# Patient Record
(demographics unavailable — no encounter records)

---

## 2025-02-15 NOTE — SOCIAL HISTORY
[FreeTextEntry6] : Patient lives with sisters (Balta, 9, Tamie and Kash 5, Carlos 1 1/2).   Mother's Occupation: Homemaker   Father's Occupation:     Parents live on top floor of maternal grandmother and step-father's home.  8/17/2021: Tamie was diagnosed with ASD at 18 months, though she is making good progress. She has a lot of sensory issues. She is scheduled for an eval with me in a few months. Her sister Kash is receiving ST and SI but she does not have ASD symptoms. Balta receives ST and OT, will go into regular  class. 8/17/22: Both sisters will be going to [employer] - Tamie (10:1:2) Kash (12:1:2). Balta will be going into ICT class for 1st. 4/10/23: Mom is pregnant and due in September so they are working with him at school about being gentle with a baby. Tamie will be staying at [facility] though they want her in general ed for , Kash will be going to [employer] ed [employer]. Balta is in 1st grade. 10/11/23: Mom just had a baby girl 3 weeks ago, doing well. Dad got a promotion so his schedule is changing. He will be a K9 officer so he will be getting a puppy.  12/22/23: New dog, no other interval changes 6/26/24: No interval changes 2/10/25: No interval changes

## 2025-02-15 NOTE — HISTORY OF PRESENT ILLNESS
[de-identified] : Stopped soccer, wasn't interested  Says sarwat Monique when prompted   [Parents] : parents [Home] : at home, [unfilled] , at the time of the visit. [Medical Office: ([facility])___] : at the medical office located in  [Telehealth (audio & video)] : This visit was provided via telehealth using real-time 2-way audio visual technology. [FreeTextEntry5] : Placement: 2nd grade Type of Class: self-contained 8:1:2, KALYAN class, K-2 bridge [employer], [place]     Special Education: IEP   Classification: Autism   Therapy: OT 2x30 (ind), PT 2x30 (group), ST 3x30 (ind) Other Accommodations & Services:  - Paraprofessional 1:1 - Approved for BIS, parent training and home ST , so far only ST started (2x30, different therapist who has been great). Waiting on behavioral therapist  Private: - [facility]WDD: approved for front door, uses stipend for music therapy, told they cannot get medicaid waiver until 8 - S/P [facility] therapy at the [facility] ([place]) - Working with advocate - S/P LI Speech Tx for feeding therapy work on AAC device but it was not very helpful  - S/P KALYAN in home - [facility] [place]   Prior Hx: Christy was evaluated by EI at 1 1/2 due to speech delay, poor eye contact/social engagement and repetitive behaviors, diagnosed with ASD. Started receiving KALYAN in June 2019.   [FreeTextEntry1] : Medication; - Increased risperidone to 2 ml at some point this year due to increase in hitting head, throwing self to floor - Currently headbanging is pretty rare - Still may throw self to floor at school but they set a timer and then he is able to recover - Usual triggers for this behavior are when he is asked to do non-preferred task   - Appetite: has a big appetite, no recent labwork - Sleep: Takes melatonin at 6:30 PM (2 mg), usually asleep by 7:45 PM. Falls asleep on floor as he is stimming on floor and then dad puts him in bed (zipped into tent). Around 3 AM he wakes up, restless, kicking walls, thrusting  - parents do not go in, usually falls back asleep in an hour and then wakes at 6 AM. Not sleepy during day. - No obvious headaches, stomachaches - No atypical movements noted, does have a lot of stimming behaviors - Occasionally see staring spells, may roll eyes back (seems to do it intentionally, will try touch eyeballs)  - May pull eyelashes at times - BMs 3 times a day - Stools are always soft  (wears underwear at school, will use toilet for urine at school but not at home, had BM in toilet at school once, usually requests diaper (points to it when he gets home) and then has BM. Will not use toilet at home for urine or stool, refuses.    School;  - Has same teacher as past 2 years - Half his class moved up to next class so many of the aides left - He got a new 1:1 which was a little bit of a struggle - Teacher broke her foot at start of this year and his 1:1 was subbing so she wasn't really supporting him (teacher came back in October) - Teacher reports on average he only attends for 3 seconds, takes him a very long time to learn a new skill  - Needs frequent stim breaks but teacher will try to make him finish a task before taking a break in sensory room  - Most things he shows at school he will not do at home (will not use communication device, they say he writes his name, can identify some letters, will say mine when they ask who's is this about lunchbox)  - Did start naming numbers at home (but mostly on his own terms, not when asked)  - Approved for home BIS through district but having trouble finding someone who will work on toileting   Home:  - Lots of vocal stims at home, jumps up and down, chinning - Very hyperactive at home, always pacing, rocking on couch  - A lot of scripting - has new words but mostly scripted  - There did seem to be some decrease in these behaviors when risperidone was increased  - Mostly stomping, running into wall, when very upset will headbang - Not hurting siblings out of anger but may play roughly, may push  - No other new behaviors at home prior: - No elopement, but parents keep him close. They wear a connected wristband when they go out, good locks on doors, and are also getting him a medical ID.   Social/Play: prior: - Much more functional play, has figures interact with each other (may script from familiar shows) - Loves to play with his animal figures - Loves to play with (or near) his siblings - More parallel play with kids at school but teacher notes his play skills are better developed than his peers  Speech: - Still uses a few words to request (aranda, pizza, juice, soda) - When mad he may call mom monster, or will say chophouse (referring to movie Piero) - Otherwise speech is all scripted   Motor:  - Writing as above - Takes clothes off, does not put on  - Attempts zipper on jacket, can pull up once started  - Can use utensils but needs help   [FreeTextEntry6] : - Health has been good, recently had flu  - ENT: In December 2022 had tubes placed (first set in 2019), adenoids/tonsils shaved. Had a hearing test prior to surgery which showed some hearing loss and the one after surgery was good.  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 - Neuro: Saw Dr. Oconnor in 2023 due to staring spells, felt that he would not tolerate EEG study, did not make any other recommendations - Genetics: Seen April 2022, microarray negative, TOM negative, Angelman negative, mitochondrial testing non-interpretable though Dr. Mayers did not recommend further testing with muscle biopsy. recommended f/u in 2 years - Eating: As above. Some increased willingness to try new foods.  Tried feeding therapy at  Speech therapy  but it wasn't helpful.  - Sleep: See above - Toileting: Wears underwear at school, will use toilet for urine at school but not at home, had BM in toilet at school once, usually requests diaper (points to it when he gets home) and then has BM. Will not use toilet at home for urine or stool, refuses.   - PMD: Dr. Magali Brown ([place]), seen recently due to flu     - Labs: o 10/3/23: cbc nl, CMP nl, TFTs nl  PMH: - Fractured right wrist in summer 2023 after fall from swing set, healed well

## 2025-02-15 NOTE — PHYSICAL EXAM
[Normal] : awake and interactive [de-identified] :  Briefly comes to screen Says sarwat Monique when prompted

## 2025-02-15 NOTE — PLAN
[Findings (To Date)] : Findings from evaluation (to date) [Clinical Basis] : Clinical basis for current diagnosis and clinical impressions [Differential Diagnosis] : Differential diagnosis [Lab work-up] : laboratory work-up [Co-Morbidities] : Clinical disorders and problem commonly associated with this child's condition (now or in the future) [Medical Consultations] : Reviewed medical consultations [AE Strategies] : Strategies to reduce side effects from current or proposed medication regimen [Other: _____] : [unfilled] [CAM Therapies] : Benefits and limits of CAM therapies [Behavior Modification] : Behavior modification strategies [Resources] : Other available resources [CSE / IEP] : Committee on Special Education (CSE) evaluations and Individualized Education Programs (IEP) [Family Questions] : Family's questions were addressed [Diet] : Evidence-based clinical information about diet [Sleep] : The importance of sleep and strategies to ensure adequate sleep [FreeTextEntry3] : - Discussed options for treating inattention, previously did not do well on guanfacine 0.5mg BID (sedation, lack of benefit or methylphenidate 5 mg BID (increased aggression). Will start Onyda XR 0.1 mg/ml (clonidine XR suspension) - start with 0.5 ml daily in evening - Continue risperidone 1 ml twice daily  - Slips emailed for fasting labwork.   - Continue IEP services - Started home ST through [facility], awaiting BIS/parent training  - Mom will send teacher summary  - Consent emailed for communication with school BCBA and staff  - Due for triennial by June 2024 - Continue private services and activities - Approved for [employer] front door services, will apply for waiver closer to age 8 - Will monitor staring spells, if increased will have Harrison see neurology here for second opinion - has not been concern lately - On melatonin as needed for sleep - ASD, ADHD, medication resources previously shared - Call with med update in 1 week, sooner as needed - Follow-up in 2-3 months

## 2025-02-15 NOTE — SOCIAL HISTORY
[FreeTextEntry6] : Patient lives with sisters (Balta, 9, Tamie and Kash 5, Carlos 1 1/2).   Mother's Occupation: Homemaker   Father's Occupation:     Parents live on top floor of maternal grandmother and step-father's home.  8/17/2021: Tamie was diagnosed with ASD at 18 months, though she is making good progress. She has a lot of sensory issues. She is scheduled for an eval with me in a few months. Her sister Kash is receiving ST and SI but she does not have ASD symptoms. Balta receives ST and OT, will go into regular  class. 8/17/22: Both sisters will be going to [employer] - Tamie (10:1:2) Kash (12:1:2). Balta will be going into ICT class for 1st. 4/10/23: Mom is pregnant and due in September so they are working with him at school about being gentle with a baby. Tamie will be staying at [facility] though they want her in general ed for , Kash will be going to [employer] ed [employer]. Balta is in 1st grade. 10/11/23: Mom just had a baby girl 3 weeks ago, doing well. Dad got a promotion so his schedule is changing. He will be a K9 officer so he will be getting a puppy.  12/22/23: New dog, no other interval changes 6/26/24: No interval changes 2/10/25: No interval changes

## 2025-02-15 NOTE — REASON FOR VISIT
[Follow-Up Visit] : a follow-up visit [FreeTextEntry2] : ASD, ADHD, GDD [FreeTextEntry4] : Risperidone 1 ml at 7:30 AM, 1 ml 7:30 PM  (started by PMD July 2023)  Prior meds: - Methylphenidate 5 mg BID (12/2023-3/2024, d/c'd due to increased aggression) - Guanfacine (1 mg tabs): titrated to 0.5 mg BID, d/c'd due to sedation, lack of benefit (Oct-Nov 2023)   [FreeTextEntry1] : Parents [FreeTextEntry5] : Chart [FreeTextEntry3] : 6/26/24

## 2025-02-15 NOTE — PHYSICAL EXAM
[Normal] : awake and interactive [de-identified] :  Briefly comes to screen Says sarwat Monique when prompted

## 2025-02-15 NOTE — PLAN
[Findings (To Date)] : Findings from evaluation (to date) [Clinical Basis] : Clinical basis for current diagnosis and clinical impressions [Differential Diagnosis] : Differential diagnosis [Lab work-up] : laboratory work-up [Co-Morbidities] : Clinical disorders and problem commonly associated with this child's condition (now or in the future) [Medical Consultations] : Reviewed medical consultations [AE Strategies] : Strategies to reduce side effects from current or proposed medication regimen [Other: _____] : [unfilled] [CAM Therapies] : Benefits and limits of CAM therapies [Behavior Modification] : Behavior modification strategies [Resources] : Other available resources [CSE / IEP] : Committee on Special Education (CSE) evaluations and Individualized Education Programs (IEP) [Family Questions] : Family's questions were addressed [Diet] : Evidence-based clinical information about diet [Sleep] : The importance of sleep and strategies to ensure adequate sleep [FreeTextEntry3] : - Discussed options for treating inattention, previously did not do well on guanfacine 0.5mg BID (sedation, lack of benefit or methylphenidate 5 mg BID (increased aggression). Will start Onyda XR 0.1 mg/ml (clonidine XR suspension) - start with 0.5 ml daily in evening - Continue risperidone 1 ml twice daily  - Slips emailed for fasting labwork.   - Continue IEP services - Started home ST through [facility], awaiting BIS/parent training  - Mom will send teacher summary  - Consent emailed for communication with school BCBA and staff  - Due for triennial by June 2024 - Continue private services and activities - Approved for OPWDD front door services, will apply for waiver closer to age 8 - Will monitor staring spells, if increased will have Harrison see neurology here for second opinion - has not been concern lately - On melatonin as needed for sleep - ASD, ADHD, medication resources previously shared - Call with med update in 1 week, sooner as needed - Follow-up in 2-3 months

## 2025-02-15 NOTE — HISTORY OF PRESENT ILLNESS
[de-identified] : Stopped soccer, wasn't interested  Says sarwat Monique when prompted   [Parents] : parents [Home] : at home, [unfilled] , at the time of the visit. [Medical Office: ([facility])___] : at the medical office located in  [Telehealth (audio & video)] : This visit was provided via telehealth using real-time 2-way audio visual technology. [FreeTextEntry5] : Placement: 2nd grade Type of Class: self-contained 8:1:2, KALYAN class, K-2 bridge [employer], [place]     Special Education: IEP   Classification: Autism   Therapy: OT 2x30 (ind), PT 2x30 (group), ST 3x30 (ind) Other Accommodations & Services:  - Paraprofessional 1:1 - Approved for BIS, parent training and home ST , so far only ST started (2x30, different therapist who has been great). Waiting on behavioral therapist  Private: - OPWDD: approved for front door, uses stipend for music therapy, told they cannot get medicaid waiver until 8 - S/P Music therapy at the [facility] ([place]) - Working with advocate - S/P LI Speech Tx for feeding therapy work on AAC device but it was not very helpful  - S/P KALYAN in home - [facility] [place])   Prior Hx: Christy was evaluated by EI at 1 1/2 due to speech delay, poor eye contact/social engagement and repetitive behaviors, diagnosed with ASD. Started receiving KALYAN in June 2019.   [FreeTextEntry1] : Medication; - Increased risperidone to 2 ml at some point this year due to increase in hitting head, throwing self to floor - Currently headbanging is pretty rare - Still may throw self to floor at school but they set a timer and then he is able to recover - Usual triggers for this behavior are when he is asked to do non-preferred task   - Appetite: has a big appetite, no recent labwork - Sleep: Takes melatonin at 6:30 PM (2 mg), usually asleep by 7:45 PM. Falls asleep on floor as he is stimming on floor and then dad puts him in bed (zipped into tent). Around 3 AM he wakes up, restless, kicking walls, thrusting  - parents do not go in, usually falls back asleep in an hour and then wakes at 6 AM. Not sleepy during day. - No obvious headaches, stomachaches - No atypical movements noted, does have a lot of stimming behaviors - Occasionally see staring spells, may roll eyes back (seems to do it intentionally, will try touch eyeballs)  - May pull eyelashes at times - BMs 3 times a day - Stools are always soft  (wears underwear at school, will use toilet for urine at school but not at home, had BM in toilet at school once, usually requests diaper (points to it when he gets home) and then has BM. Will not use toilet at home for urine or stool, refuses.    School;  - Has same teacher as past 2 years - Half his class moved up to next class so many of the aides left - He got a new 1:1 which was a little bit of a struggle - Teacher broke her foot at start of this year and his 1:1 was subbing so she wasn't really supporting him (teacher came back in October) - Teacher reports on average he only attends for 3 seconds, takes him a very long time to learn a new skill  - Needs frequent stim breaks but teacher will try to make him finish a task before taking a break in sensory room  - Most things he shows at school he will not do at home (will not use communication device, they say he writes his name, can identify some letters, will say mine when they ask who's is this about lunchbox)  - Did start naming numbers at home (but mostly on his own terms, not when asked)  - Approved for home BIS through district but having trouble finding someone who will work on toileting   Home:  - Lots of vocal stims at home, jumps up and down, chinning - Very hyperactive at home, always pacing, rocking on couch  - A lot of scripting - has new words but mostly scripted  - There did seem to be some decrease in these behaviors when risperidone was increased  - Mostly stomping, running into wall, when very upset will headbang - Not hurting siblings out of anger but may play roughly, may push  - No other new behaviors at home prior: - No elopement, but parents keep him close. They wear a connected wristband when they go out, good locks on doors, and are also getting him a medical ID.   Social/Play: prior: - Much more functional play, has figures interact with each other (may script from familiar shows) - Loves to play with his animal figures - Loves to play with (or near) his siblings - More parallel play with kids at school but teacher notes his play skills are better developed than his peers  Speech: - Still uses a few words to request (aranda, pizza, juice, soda) - When mad he may call mom monster, or will say chophouse (referring to movie Piero) - Otherwise speech is all scripted   Motor:  - Writing as above - Takes clothes off, does not put on  - Attempts zipper on jacket, can pull up once started  - Can use utensils but needs help   [FreeTextEntry6] : - Health has been good, recently had flu  - ENT: In December 2022 had tubes placed (first set in 2019), adenoids/tonsils shaved. Had a hearing test prior to surgery which showed some hearing loss and the one after surgery was good.  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 - Neuro: Saw Dr. Oconnor in 2023 due to staring spells, felt that he would not tolerate EEG study, did not make any other recommendations - Genetics: Seen April 2022, microarray negative, TOM negative, Angelman negative, mitochondrial testing non-interpretable though Dr. Mayers did not recommend further testing with muscle biopsy. recommended f/u in 2 years - Eating: As above. Some increased willingness to try new foods.  Tried feeding therapy at  Speech therapy  but it wasn't helpful.  - Sleep: See above - Toileting: Wears underwear at school, will use toilet for urine at school but not at home, had BM in toilet at school once, usually requests diaper (points to it when he gets home) and then has BM. Will not use toilet at home for urine or stool, refuses.   - PMD: Dr. Magali Brown ([place]), seen recently due to flu     - Labs: o 10/3/23: cbc nl, CMP nl, TFTs nl  PMH: - Fractured right wrist in summer 2023 after fall from swing set, healed well